# Patient Record
Sex: FEMALE | Race: WHITE
[De-identification: names, ages, dates, MRNs, and addresses within clinical notes are randomized per-mention and may not be internally consistent; named-entity substitution may affect disease eponyms.]

---

## 2018-05-18 ENCOUNTER — HOSPITAL ENCOUNTER (OUTPATIENT)
Dept: HOSPITAL 84 - D.US | Age: 28
Discharge: HOME | End: 2018-05-18
Attending: FAMILY MEDICINE
Payer: COMMERCIAL

## 2018-05-18 DIAGNOSIS — R10.11: Primary | ICD-10-CM

## 2018-12-02 ENCOUNTER — HOSPITAL ENCOUNTER (OUTPATIENT)
Dept: HOSPITAL 84 - D.LDO | Age: 28
Discharge: HOME | End: 2018-12-02
Attending: OBSTETRICS & GYNECOLOGY
Payer: COMMERCIAL

## 2018-12-02 DIAGNOSIS — M54.5: ICD-10-CM

## 2018-12-02 DIAGNOSIS — R10.2: ICD-10-CM

## 2018-12-02 DIAGNOSIS — Z3A.19: ICD-10-CM

## 2018-12-02 DIAGNOSIS — O26.892: Primary | ICD-10-CM

## 2018-12-02 LAB
AMPHETAMINES UR QL SCN: NEGATIVE QUAL
APPEARANCE UR: CLEAR
BARBITURATES UR QL SCN: NEGATIVE QUAL
BENZODIAZ UR QL SCN: NEGATIVE QUAL
BILIRUB SERPL-MCNC: NEGATIVE MG/DL
BZE UR QL SCN: NEGATIVE QUAL
CANNABINOIDS UR QL SCN: NEGATIVE QUAL
COLOR UR: YELLOW
GLUCOSE SERPL-MCNC: 50 MG/DL
KETONES UR STRIP-MCNC: NEGATIVE MG/DL
NITRITE UR-MCNC: NEGATIVE MG/ML
OPIATES UR QL SCN: NEGATIVE QUAL
PCP UR QL SCN: NEGATIVE QUAL
PH UR STRIP: 5 [PH] (ref 5–6)
PROT UR-MCNC: NEGATIVE MG/DL
SP GR UR STRIP: 1.02 (ref 1–1.02)
UROBILINOGEN UR-MCNC: NORMAL MG/DL

## 2019-01-31 ENCOUNTER — HOSPITAL ENCOUNTER (OUTPATIENT)
Dept: HOSPITAL 84 - D.LDO | Age: 29
Discharge: HOME | End: 2019-01-31
Attending: OBSTETRICS & GYNECOLOGY
Payer: COMMERCIAL

## 2019-01-31 DIAGNOSIS — R10.2: ICD-10-CM

## 2019-01-31 DIAGNOSIS — O26.893: Primary | ICD-10-CM

## 2019-01-31 DIAGNOSIS — M54.5: ICD-10-CM

## 2019-01-31 DIAGNOSIS — Z3A.28: ICD-10-CM

## 2019-01-31 LAB
APPEARANCE UR: CLEAR
BILIRUB SERPL-MCNC: NEGATIVE MG/DL
COLOR UR: YELLOW
GLUCOSE SERPL-MCNC: NEGATIVE MG/DL
KETONES UR STRIP-MCNC: NEGATIVE MG/DL
NITRITE UR-MCNC: NEGATIVE MG/ML
PH UR STRIP: 7 [PH] (ref 5–6)
PROT UR-MCNC: NEGATIVE MG/DL
RBC #/AREA URNS HPF: (no result) /HPF (ref 0–5)
SP GR UR STRIP: 1.01 (ref 1–1.02)
SQUAMOUS #/AREA URNS HPF: (no result) /HPF (ref 0–5)
UROBILINOGEN UR-MCNC: NORMAL MG/DL
WBC #/AREA URNS HPF: (no result) /HPF (ref 0–5)

## 2019-02-05 ENCOUNTER — HOSPITAL ENCOUNTER (OUTPATIENT)
Dept: HOSPITAL 84 - D.LDO | Age: 29
Discharge: HOME | End: 2019-02-05
Attending: OBSTETRICS & GYNECOLOGY
Payer: COMMERCIAL

## 2019-02-05 DIAGNOSIS — O26.893: Primary | ICD-10-CM

## 2019-02-05 DIAGNOSIS — Z3A.29: ICD-10-CM

## 2019-02-05 LAB — FIBRONECTIN FETAL VAG QL: NEGATIVE

## 2019-03-05 ENCOUNTER — HOSPITAL ENCOUNTER (OUTPATIENT)
Dept: HOSPITAL 84 - D.LDO | Age: 29
Discharge: HOME | End: 2019-03-05
Attending: OBSTETRICS & GYNECOLOGY
Payer: COMMERCIAL

## 2019-03-05 DIAGNOSIS — O26.893: Primary | ICD-10-CM

## 2019-03-05 DIAGNOSIS — N89.8: ICD-10-CM

## 2019-03-05 LAB
APPEARANCE UR: CLEAR
BILIRUB SERPL-MCNC: NEGATIVE MG/DL
COLOR UR: YELLOW
GLUCOSE SERPL-MCNC: NEGATIVE MG/DL
KETONES UR STRIP-MCNC: NEGATIVE MG/DL
NITRITE UR-MCNC: NEGATIVE MG/ML
PH UR STRIP: 8 [PH] (ref 5–6)
PROT UR-MCNC: NEGATIVE MG/DL
SP GR UR STRIP: 1.01 (ref 1–1.02)
UROBILINOGEN UR-MCNC: NORMAL MG/DL

## 2019-03-06 ENCOUNTER — HOSPITAL ENCOUNTER (OUTPATIENT)
Dept: HOSPITAL 84 - D.LDO | Age: 29
Discharge: HOME | End: 2019-03-06
Attending: OBSTETRICS & GYNECOLOGY
Payer: COMMERCIAL

## 2019-03-06 DIAGNOSIS — Z3A.33: ICD-10-CM

## 2019-03-06 DIAGNOSIS — O60.03: Primary | ICD-10-CM

## 2019-03-14 ENCOUNTER — HOSPITAL ENCOUNTER (OUTPATIENT)
Dept: HOSPITAL 84 - D.LDO | Age: 29
Discharge: HOME | End: 2019-03-14
Attending: OBSTETRICS & GYNECOLOGY
Payer: COMMERCIAL

## 2019-03-14 ENCOUNTER — HOSPITAL ENCOUNTER (OUTPATIENT)
Dept: HOSPITAL 84 - D.LDO | Age: 29
LOS: 1 days | End: 2019-03-15
Attending: OBSTETRICS & GYNECOLOGY
Payer: COMMERCIAL

## 2019-03-14 DIAGNOSIS — O26.893: Primary | ICD-10-CM

## 2019-03-14 DIAGNOSIS — O26.899: Primary | ICD-10-CM

## 2019-03-14 DIAGNOSIS — Z3A.34: ICD-10-CM

## 2019-03-22 ENCOUNTER — HOSPITAL ENCOUNTER (OUTPATIENT)
Dept: HOSPITAL 84 - D.LDO | Age: 29
Discharge: HOME | End: 2019-03-22
Attending: OBSTETRICS & GYNECOLOGY
Payer: COMMERCIAL

## 2019-03-22 DIAGNOSIS — Z3A.35: ICD-10-CM

## 2019-03-22 DIAGNOSIS — O26.893: Primary | ICD-10-CM

## 2019-03-22 LAB
APPEARANCE UR: CLEAR
BACTERIA #/AREA URNS HPF: (no result) /HPF
BILIRUB SERPL-MCNC: NEGATIVE MG/DL
COLOR UR: YELLOW
GLUCOSE SERPL-MCNC: NEGATIVE MG/DL
KETONES UR STRIP-MCNC: NEGATIVE MG/DL
NITRITE UR-MCNC: NEGATIVE MG/ML
PH UR STRIP: 7 [PH] (ref 5–6)
PROT UR-MCNC: (no result) MG/DL
RBC #/AREA URNS HPF: (no result) /HPF (ref 0–5)
SP GR UR STRIP: 1.01 (ref 1–1.02)
UROBILINOGEN UR-MCNC: NORMAL MG/DL
WBC #/AREA URNS HPF: (no result) /HPF (ref 0–5)

## 2019-03-24 ENCOUNTER — HOSPITAL ENCOUNTER (OUTPATIENT)
Dept: HOSPITAL 84 - D.LDO | Age: 29
Discharge: HOME | End: 2019-03-24
Attending: OBSTETRICS & GYNECOLOGY
Payer: COMMERCIAL

## 2019-03-24 DIAGNOSIS — O26.893: Primary | ICD-10-CM

## 2019-03-24 DIAGNOSIS — Z3A.35: ICD-10-CM

## 2019-03-24 LAB
APPEARANCE UR: CLEAR
BILIRUB SERPL-MCNC: NEGATIVE MG/DL
COLOR UR: YELLOW
GLUCOSE SERPL-MCNC: NEGATIVE MG/DL
KETONES UR STRIP-MCNC: NEGATIVE MG/DL
NITRITE UR-MCNC: NEGATIVE MG/ML
PH UR STRIP: 7 [PH] (ref 5–6)
PROT UR-MCNC: NEGATIVE MG/DL
SP GR UR STRIP: 1.01 (ref 1–1.02)
UROBILINOGEN UR-MCNC: NORMAL MG/DL

## 2019-03-26 ENCOUNTER — HOSPITAL ENCOUNTER (OUTPATIENT)
Dept: HOSPITAL 84 - D.LDO | Age: 29
Discharge: HOME | End: 2019-03-26
Attending: OBSTETRICS & GYNECOLOGY
Payer: COMMERCIAL

## 2019-03-26 DIAGNOSIS — O26.853: Primary | ICD-10-CM

## 2019-03-26 DIAGNOSIS — Z3A.36: ICD-10-CM

## 2019-03-26 DIAGNOSIS — R10.9: ICD-10-CM

## 2019-03-26 LAB
APPEARANCE UR: CLEAR
BACTERIA #/AREA URNS HPF: (no result) /HPF
BILIRUB SERPL-MCNC: NEGATIVE MG/DL
COLOR UR: YELLOW
GLUCOSE SERPL-MCNC: NEGATIVE MG/DL
KETONES UR STRIP-MCNC: NEGATIVE MG/DL
NITRITE UR-MCNC: NEGATIVE MG/ML
PH UR STRIP: 6 [PH] (ref 5–6)
PROT UR-MCNC: NEGATIVE MG/DL
RBC #/AREA URNS HPF: (no result) /HPF (ref 0–5)
SP GR UR STRIP: 1.01 (ref 1–1.02)
SQUAMOUS #/AREA URNS HPF: (no result) /HPF (ref 0–5)
UROBILINOGEN UR-MCNC: NORMAL MG/DL
WBC #/AREA URNS HPF: (no result) /HPF (ref 0–5)

## 2019-04-02 ENCOUNTER — HOSPITAL ENCOUNTER (OUTPATIENT)
Dept: HOSPITAL 84 - D.LDO | Age: 29
Discharge: HOME | End: 2019-04-02
Attending: OBSTETRICS & GYNECOLOGY
Payer: COMMERCIAL

## 2019-04-02 DIAGNOSIS — O16.3: Primary | ICD-10-CM

## 2019-04-02 DIAGNOSIS — Z3A.37: ICD-10-CM

## 2019-04-02 DIAGNOSIS — Z3A.36: ICD-10-CM

## 2019-04-02 DIAGNOSIS — O26.893: Primary | ICD-10-CM

## 2019-04-02 LAB
APPEARANCE UR: (no result)
BILIRUB SERPL-MCNC: NEGATIVE MG/DL
COLOR UR: YELLOW
GLUCOSE SERPL-MCNC: NEGATIVE MG/DL
KETONES UR STRIP-MCNC: NEGATIVE MG/DL
NITRITE UR-MCNC: NEGATIVE MG/ML
PH UR STRIP: 6 [PH] (ref 5–6)
PROT UR-MCNC: NEGATIVE MG/DL
SP GR UR STRIP: 1.02 (ref 1–1.02)
UROBILINOGEN UR-MCNC: NORMAL MG/DL

## 2019-04-09 ENCOUNTER — HOSPITAL ENCOUNTER (INPATIENT)
Dept: HOSPITAL 84 - D.LD | Age: 29
LOS: 2 days | Discharge: HOME | End: 2019-04-11
Attending: OBSTETRICS & GYNECOLOGY | Admitting: OBSTETRICS & GYNECOLOGY
Payer: COMMERCIAL

## 2019-04-09 VITALS — WEIGHT: 198 LBS | BODY MASS INDEX: 30.01 KG/M2 | HEIGHT: 68 IN

## 2019-04-09 VITALS — DIASTOLIC BLOOD PRESSURE: 68 MMHG | SYSTOLIC BLOOD PRESSURE: 123 MMHG

## 2019-04-09 DIAGNOSIS — F43.29: ICD-10-CM

## 2019-04-09 DIAGNOSIS — Z3A.38: ICD-10-CM

## 2019-04-09 PROCEDURE — 10907ZC DRAINAGE OF AMNIOTIC FLUID, THERAPEUTIC FROM PRODUCTS OF CONCEPTION, VIA NATURAL OR ARTIFICIAL OPENING: ICD-10-PCS | Performed by: OBSTETRICS & GYNECOLOGY

## 2019-04-10 VITALS — DIASTOLIC BLOOD PRESSURE: 82 MMHG | SYSTOLIC BLOOD PRESSURE: 128 MMHG

## 2019-04-10 LAB
ERYTHROCYTE [DISTWIDTH] IN BLOOD BY AUTOMATED COUNT: 13 % (ref 11.5–14.5)
HCT VFR BLD CALC: 25.7 % (ref 36–48)
HGB BLD-MCNC: 8.6 G/DL (ref 12–16)
MCH RBC QN AUTO: 28.2 PG (ref 26–34)
MCHC RBC AUTO-ENTMCNC: 33.5 G/DL (ref 31–37)
MCV RBC: 84.3 FL (ref 80–100)
PLATELET # BLD: 176 10X3/UL (ref 130–400)
PMV BLD AUTO: 12.2 FL (ref 7.4–10.4)
PROT UR-MCNC: 9.5 MG/DL (ref 0–11.9)
PROTEIN [MASS/TIME] IN 12 HOUR URINE: 128 MG/12HR (ref 0–83)
RBC # BLD AUTO: 3.05 10X6/UL (ref 4–5.4)
WBC # BLD AUTO: 14.1 10X3/UL (ref 4.8–10.8)

## 2019-04-10 NOTE — NUR
RESTING WITH EYES CLOSED, RESPIRATIONS REGULAR AND UNLABORED, NO S/S OF
DISTRESS NOTED. SPOUSE RESTING ON COUCH. BED IN LOW POSITION WITH UPPER SIDE
RAILS RAISED X2. CALL LIGHT AND PHONE WITHIN REACH. WILL CONTINUE TO MONITOR
AND ASSIST PRN.

## 2019-04-10 NOTE — NUR
BREASTFEEDING INFANT AT THIS TIME. DENIES NEEDS. ICE WATER PROVIDED. SPOUSE
REMAINS AT BEDSIDE, SUPPORTIVE AND ATTENTIVE TO PT AND INFANT. BED IN LOW
POSITION WITH UPPER SIDE RAILS RAISED X2. CALL LIGHT AND PHONE WITHIN REACH.
WILL CONTINUE TO MONITOR AND ASSIST PRN.

## 2019-04-10 NOTE — NUR
MULTIPLE VISITOR REMAIN AT BEDSIDE. PT CONVERSING AND LAUGHING WITH VISITORS.
DENIES NEEDS AT THIS TIME. BED IN LOW POSITION WITH UPPER SIDE RAILS RAISED
X2. CALL LIGHT AND PHONE WITHIN REACH. WILL CONTINUE TO MONITOR AND ASSIST
PRN.

## 2019-04-10 NOTE — NUR
DENIES NEEDS AT THIS TIME.  TOWELS PLACED IN BATHROOM AND PT UNDERSTANDS TO
CALL WHEN SHE IS READY TO SHOWER SO THAT IV CAN BE WRAPPED.

## 2019-04-10 NOTE — NUR
BREAST FEEDING INFANT. STATES BACK PAIN IS GONE BUT CRAMPING RETURNED WITH
BREASTFEEDING AT 5/10, DENIES NEED FOR INTERVENTION AT THIS TIME. ICE WATER
PROVIDED. BED IN LOW POSITION WITH UPPER SIDE RAILS RAISED X2. CL AND PHONE
WITHIN REACH. WILL CONTINUE TO MONITOR AND ASSIST PRN.

## 2019-04-10 NOTE — NUR
SHIFT ASSESSMENT COMPLETED PER FLOWSHEET. C/O ABD CRAMPING 4/10, NORCO GIVEN
PER ORDER AND PT REQUEST. VSS. FUNDUS FIRM MIDLINE AND U2, SMALL AMT RUBRA
LOCHIA, NO CLOTS NOTED. REPORTS THAT SHE IS VOIDING WITHOUT DIFFICULTY AND
USING PERIBOTTLE WITH EACH VOID. PLAN OF CARE REVIEWED WITH PT AND SPOUSE,
VERBALIZE UNDERSTANDING AND AGREEMENT, DENY QUESTIONS. BED IN LOW POSITION
WITH UPPER SIDE RAILS RAISED X2. CALL LIGHT AND PHONE WITHIN REACH. WILL
CONTINUE TO MONITOR AND ASSIST PRN.

## 2019-04-10 NOTE — NUR
OUT OF SHOWER. C/O CONSTANT BACK ACHE 7/10 AND ABD CRAMPING 4-5/10. MOTRIN
GIVEN PER ORDER AND REQUEST. REQUEST HEATING PAD. ORDER OBTAINED FOR K-PAD,
K-PAD PROVIDED AND INSTRUCTED ON USE, VERBALIZES UNDERSTANDING.

## 2019-04-10 NOTE — NUR
REQUESTS TO SHOWER, REFUSES LINEN CHANGE. SPOUSE REPORTS THAT HE WILL REMAIN
IN ROOM FOR SHOWER. INSTRUCTED ON CALL LIGHT USE IN BATHROOM, VERBALIZES
UNDERSTANDING. WILL CONTINUE TO MONITOR.

## 2019-04-10 NOTE — NUR
PT CALLS OUT WITH REQUEST FOR PAIN MED,  MEDS GIVEN AS SCANNED TO EMAR.
INFANT TO BREAST AT THIS TIME,  SIG OTHER AT BEDSIDE.  SIDE RAILS UP X 2 WITH
CALL LIGHT IN REACH.

## 2019-04-11 VITALS — SYSTOLIC BLOOD PRESSURE: 124 MMHG | DIASTOLIC BLOOD PRESSURE: 76 MMHG

## 2019-04-11 LAB
BASOPHILS NFR BLD AUTO: 0.1 % (ref 0–2)
BASOPHILS NFR BLD AUTO: 0.2 % (ref 0–2)
EOSINOPHIL NFR BLD: 0.8 % (ref 0–7)
EOSINOPHIL NFR BLD: 0.9 % (ref 0–7)
ERYTHROCYTE [DISTWIDTH] IN BLOOD BY AUTOMATED COUNT: 12.8 % (ref 11.5–14.5)
ERYTHROCYTE [DISTWIDTH] IN BLOOD BY AUTOMATED COUNT: 13.1 % (ref 11.5–14.5)
HCT VFR BLD CALC: 29.7 % (ref 36–48)
HCT VFR BLD CALC: 30.5 % (ref 36–48)
HGB BLD-MCNC: 10.1 G/DL (ref 12–16)
HGB BLD-MCNC: 10.4 G/DL (ref 12–16)
IMM GRANULOCYTES NFR BLD: 0.7 % (ref 0–5)
IMM GRANULOCYTES NFR BLD: 1 % (ref 0–5)
LYMPHOCYTES NFR BLD AUTO: 21.7 % (ref 15–50)
LYMPHOCYTES NFR BLD AUTO: 23.3 % (ref 15–50)
MCH RBC QN AUTO: 28.5 PG (ref 26–34)
MCH RBC QN AUTO: 28.5 PG (ref 26–34)
MCHC RBC AUTO-ENTMCNC: 34 G/DL (ref 31–37)
MCHC RBC AUTO-ENTMCNC: 34.1 G/DL (ref 31–37)
MCV RBC: 83.6 FL (ref 80–100)
MCV RBC: 83.7 FL (ref 80–100)
MONOCYTES NFR BLD: 6.6 % (ref 2–11)
MONOCYTES NFR BLD: 7.4 % (ref 2–11)
NEUTROPHILS NFR BLD AUTO: 68.1 % (ref 40–80)
NEUTROPHILS NFR BLD AUTO: 69.2 % (ref 40–80)
PLATELET # BLD: 132 10X3/UL (ref 130–400)
PLATELET # BLD: 134 10X3/UL (ref 130–400)
PMV BLD AUTO: 11.8 FL (ref 7.4–10.4)
PMV BLD AUTO: 11.9 FL (ref 7.4–10.4)
RBC # BLD AUTO: 3.55 10X6/UL (ref 4–5.4)
RBC # BLD AUTO: 3.65 10X6/UL (ref 4–5.4)
WBC # BLD AUTO: 10.2 10X3/UL (ref 4.8–10.8)
WBC # BLD AUTO: 11.4 10X3/UL (ref 4.8–10.8)

## 2019-04-11 NOTE — NUR
Carola Oh
4/11/[birth year]
 
O: CLC enters room for breastfeeding consultation. Room is dark with dim
lights, patient is sleeping in bed, did not wake when entered room. Infant is
sleeping in crib at bedside, FOB lying on sofa awake and states patient is
sleeping, she is tired. CLC left patient undisturbed.
 
Molly Grover, CLC

## 2019-04-11 NOTE — NUR
ATTEMPT TO ROUND ON PT FOR AM ASSESSMENT, PRIOR TO KNOCKING ON DOOR LOUD
SHOUTING COULD BE HEARD, WHEN NURSE KNOCKS PT SIG OTHER ASK FOR A MINUTE
BEFORE ENTERING, THIS RN ANNOUNCES WHO SHE IS AND AGAIN SIG OTHER REQUEST A
MIN, THAN COMES TO DOOR AND ASK IF NURSE COULD COME BACK IN 20-30 MINUTES.
VERIFIED WITH PT THAT SHE WAS OK AND VERBAL CONSENT THAT SHE WAS IS RECIEVED.

## 2019-04-11 NOTE — NUR
C/O PAIN 6/10 CONSTANT BACK ACHE, WITH INTERMITTENT ABD CRAMPING. REPORTS THAT
MOTRIN HELPED BUT CRAMPING INCREASED WITH BREAST FEEDING. NORCO GIVEN PER
ORDER AND PT REQUEST. INFANT RESTING IN OPEN CRIB AT BEDSIDE. SPOUSE REMAINS
AT BEDSIDE, SUPPORTIVE AND ATTENTIVE TO PT AND INFANT NEEDS. BED IN LOW
POSITION WITH UPPER SIDE RAILS RAISED X2. CALL LIGHT AND PHONE WITHIN REACH.
WILL CONTINUE TO MONITOR AND ASSIST PRN.

## 2019-04-11 NOTE — NUR
VERBAL AND WRITTEN DISCHARGE INSTRUCTIONS GONE OVER WITH PT, SHE VERBALLY
STATES HER UNDERSTANDING TO INFO/MEDS/S&S OF INFECTION AND WHAT TO DO IF SHE
HAS ANY QUESTIONS OR CONCERNS AFTER SHE IS HOME.  WRITTEN SCRIPT PROVIDED FOR
MOTRIN 600MG. DRESSING INFANT AT THIS TIME AND THAN WILL SECURE IN CARRIER AND
CALL NURSERY TO VERIFY. WILL CALL WHEN READY TO LEAVE.

## 2019-04-11 NOTE — NUR
C/O PAIN 7/10 CONSTANT BACK ACHE AND INTERMITTENT ABD CRAMPING. NORCO AND
MOTRIN GIVEN PER ORDER AND PT REQUEST. ICE WATER PROVIDED PER REQUEST. SITTING
IN HIGH FOWLERS POSITION PLAYING ON CELL PHONE. DENIES ADDITIONAL NEEDS.
INFANT IN OPEN CRIB RESTING QUIETLY AT BEDSIDE. SPOUSE RESTING ON COUCH. BED
IN LOW POSITION WITH UPPER SIDE RAILS RAISED X2. CALL LIGHT AND PHONE WITHIN
REACH. WILL CONTINUE TO MONITOR AND ASSIST PRN.

## 2019-04-11 NOTE — NUR
PT FAMILY MEMBER TO NURSE MANAGER'S OFFICE TO REPORT CONCERNS OF PT'S MENTAL
STATUS. FAMILY MEMBER REPORTS PT HAS VERBALIZED A THREAT TO KILL HERSELF AND
HER  INFANT RECENTLY AND HAS SENT TEXT MESSAGES AS TO SUCH TO FOB. THIS
RN ENSURE THAT DR BROWN WILL BE NOTIFIED AND THE REPORT WILL BE ESCALATED
TO ALL THAT NEED TO BE AWARE.

## 2019-04-11 NOTE — NUR
Dr Valle notified with report given of recent info about pt possible threat
to harm herself &/or baby.  Orders received to contact/consult with case
management and/or  psych consult. Spoke with Shantelle in case management, order
than place for phys consult attn psych.  She (Shantelle) is assisting by taking
order to deepa malcolm and speaking to MD in that area.

## 2019-04-11 NOTE — NUR
PAIN MEDS GIVEN PER PT REQUEST.  SHE IS BREASTFEEDING AT THIS TIME, SIDE RAILS
UP X 2 WITH PHONE AND CALL LIGHT IN REACH.

## 2019-04-11 NOTE — NUR
DR SMITH ON UNIT WITH CASE MANAGEMENT, THIS RN TO ROOM WITH HIM FOR HIS
ASSESSMENT PER MD REQUEST.  UPON ENTERING ROOM, PT IS ALONE WITH INFANT TO
BREAST AND WATCHING TV.  SHE CALMLY LISTENS AS DR SMITH EXPLAINS WHY HE HAS
BEEN CONSULTED, PLEASE SEE MD NOTE ABOUT HIS ASSESSMENT.

## 2019-04-11 NOTE — NUR
DR. BROWN PAGED WITH IMMEDIATE CALL BACK. CBC RESULTS AND PT MOST RECENT
INTERACTION WITH STAFF REPORTED. PER DR. BROWN CONTINUE WITH CURRENT
INTERVENTIONS FOR PAIN.

## 2019-04-11 NOTE — NUR
PAIN REASSESSMENT COMPLETED. PT TEARFUL, VERBALIZES NO CHANGE IN PAIN.
REPORTED TO DR. BROWN. ORDERS REC'D FOR CBC TO BE DRAWN AND CALL RESULTS TO
HIM. NOTIFIED MOHSEN IN LAB, STATES THAT LAB WILL COME TO UNIT AND DRAW.

## 2019-04-11 NOTE — NUR
PT TAKEN OUT WITH INFANT IN CARRIER, HOME BY PRIVATE CAR WITH FATHER OF BABY.
=============================================================================

## 2019-04-11 NOTE — NUR
PT RESTING ON RIGHT SIDE WITH EYES CLOSED, RESPIRATION REGULAR AND UNLABORED,
NO S/S OF DISTRESS NOTED. REPORT GIVEN TO PAMELA ALVAREZ RN.

## 2019-04-11 NOTE — NUR
AM ASSESSMENT COMPLETED,  PT IS ALONE AT THIS TIME AND EXPRESSES THAT SHE IS
SORRY FOR EARLIER AND SHE IS JUST REALLY TIRED AND NEEDS SOME SLEEP. WILL CALL
FOR NURSE IF NEEDED.  SIDE RAILS UP X 2 CALL LIGHT IN REACH.

## 2019-04-11 NOTE — NUR
PT RETURNS TO UNIT FOR RHOGHAM INJECTION PER MD ORDERS.  PT ADMINISTERED
RHOGHAM 300MG IM TO LEFT DELTOID, BANDAID APPLIED, TOLERATES PROCEDURE WELL.

## 2019-04-13 NOTE — CN
PATIENT NAME:ODALIS FRY                        MEDICAL RECORD: [id number]
: 89                                              LOCATION:[id number]
ADMIT DATE: 19                                       ACCOUNT: [id number]
CONSULTING PHYSICIAN:    AMIE SMITH MD             
                                               
REFERRING PHYSICIAN:     LESLIE BROWN MD        
 
 
DATE OF CONSULTATION:  2019
 
IDENTIFYING DATA:  The patient is 28 years old and admitted to the hospital
secondary to needing to deliver a baby.
 
CHIEF COMPLAINT:  None.
 
HISTORY OF PRESENT ILLNESS:  I have gotten information almost immediately after
hanging up the phone and have evaluated the patient.  It seems that the father
of this child and the patient, Odalis have argued and have broken up
unfortunately.  The paternal grandmother reported to our nursing staff that she
was concerned that Odalis was going to go home and kill herself and the baby.  I
have interviewed Odalis and disagree.  Odalis has an euthymic mood and appropriate
affect, is fully oriented and passes all questions on her mental status exam
fine.  She has never made any statement like this.  Her former boyfriend who is
the father of this child comes in and says that they have argued, that she has
lost control of herself, but when questioned about this, it seems that Odalis has
told him to leave and cursed him.  She has never made any statements about
wanting to hurt herself or the baby.  He was asked directly is he concerned
about the safety of the baby or Odalis and he says no.  He has no concerns about
their safety.
 
ASSESSMENT:  Adjustment disorder with mixed emotional features.
 
PLAN:  Odalis is not acutely dangerous to herself or the baby.  She did score an
8 on her depression screening, but I still do not think she is clinically
depressed.  I am not sure if she answered all the questions correctly.  She has
ADHD and is treated for that and has not had her psychostimulant medication and
that is subtly obvious when I talked to her.  I see no evidence of an acute mood
disorder.  No evidence of acute or direct dangerousness.  The patient has her
baby at her breast while I am in the room speaking with her and it is clear that
she loves the child, has bonded with the child and is showing the child
affection.  I see no impediment to releasing her and the child and see no reason
or purpose for an outpatient mental health followup.
 
TRANSINT:[id number] Voice Confirmation ID: [id number] DOCUMENT ID: [id number]
                                           
                                           AMIE SMITH MD             
 
 
 
Electronically Signed by AMIE SMITH on 19 at 1233
CC:                                                             [id number]
DICTATION DATE: 19 1548     :     19 1604      DIS IN  
                                                                      19
[place] [id number] [place] [id number]

## 2019-09-03 NOTE — NUR
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 Pt sent for orthopedics to cast it VERBAL AND WRITTEN DISCHARGE INSTRUCTIONS GONE OVER WITH PT, SHE VERBALLY
    STATES HER UNDERSTANDING TO INFO/MEDS/S&S OF INFECTION AND WHAT TO DO IF
SHE
    HAS ANY QUESTIONS OR CONCERNS AFTER SHE IS HOME.  WRITTEN SCRIPT PROVIDED
FOR
    MOTRIN 600MG. DRESSING INFANT AT THIS TIME AND THAN WILL SECURE IN CARRIER
AND
    CALL NURSERY TO VERIFY. WILL CALL WHEN READY TO LEAVE.
 
==============================================================================

## 2020-08-02 ENCOUNTER — HOSPITAL ENCOUNTER (EMERGENCY)
Dept: HOSPITAL 84 - D.ER | Age: 30
Discharge: HOME | End: 2020-08-02
Payer: COMMERCIAL

## 2020-08-02 VITALS
BODY MASS INDEX: 24.3 KG/M2 | WEIGHT: 160.34 LBS | HEIGHT: 68 IN | WEIGHT: 160.34 LBS | BODY MASS INDEX: 24.3 KG/M2 | HEIGHT: 68 IN

## 2020-08-02 VITALS — SYSTOLIC BLOOD PRESSURE: 112 MMHG | DIASTOLIC BLOOD PRESSURE: 71 MMHG

## 2020-08-02 DIAGNOSIS — R11.0: ICD-10-CM

## 2020-08-02 DIAGNOSIS — U07.1: Primary | ICD-10-CM

## 2020-08-02 DIAGNOSIS — R19.7: ICD-10-CM

## 2020-08-02 DIAGNOSIS — R50.9: ICD-10-CM

## 2020-08-02 DIAGNOSIS — R53.81: ICD-10-CM

## 2020-08-02 LAB
ALBUMIN SERPL-MCNC: 3.9 G/DL (ref 3.4–5)
ALP SERPL-CCNC: 68 U/L (ref 30–120)
ALT SERPL-CCNC: 16 U/L (ref 10–68)
ANION GAP SERPL CALC-SCNC: 11.9 MMOL/L (ref 8–16)
BASOPHILS NFR BLD AUTO: 0.2 % (ref 0–2)
BILIRUB SERPL-MCNC: 0.89 MG/DL (ref 0.2–1.3)
BILIRUB SERPL-MCNC: NEGATIVE MG/DL
BUN SERPL-MCNC: 14 MG/DL (ref 7–18)
CALCIUM SERPL-MCNC: 8.8 MG/DL (ref 8.5–10.1)
CHLORIDE SERPL-SCNC: 104 MMOL/L (ref 98–107)
CO2 SERPL-SCNC: 27.8 MMOL/L (ref 21–32)
CREAT SERPL-MCNC: 0.9 MG/DL (ref 0.6–1.3)
EOSINOPHIL NFR BLD: 1.2 % (ref 0–7)
ERYTHROCYTE [DISTWIDTH] IN BLOOD BY AUTOMATED COUNT: 11.9 % (ref 11.5–14.5)
GLOBULIN SER-MCNC: 3.6 G/L
GLUCOSE SERPL-MCNC: NEGATIVE MG/DL
HCG UR QL: NEGATIVE
HCT VFR BLD CALC: 42.4 % (ref 36–48)
HGB BLD-MCNC: 14.5 G/DL (ref 12–16)
IMM GRANULOCYTES NFR BLD: 0.2 % (ref 0–5)
KETONES UR STRIP-MCNC: NEGATIVE MG/DL
LYMPHOCYTES NFR BLD AUTO: 32.2 % (ref 15–50)
MCH RBC QN AUTO: 30.2 PG (ref 26–34)
MCHC RBC AUTO-ENTMCNC: 34.2 G/DL (ref 31–37)
MCV RBC: 88.3 FL (ref 80–100)
MONOCYTES NFR BLD: 14.3 % (ref 2–11)
NEUTROPHILS NFR BLD AUTO: 51.9 % (ref 40–80)
NITRITE UR-MCNC: NEGATIVE MG/ML
OSMOLALITY SERPL CALC.SUM OF ELEC: 279 MOSM/KG (ref 275–300)
PH UR STRIP: 5 [PH] (ref 5–6)
PLATELET # BLD: 164 10X3/UL (ref 130–400)
PMV BLD AUTO: 10.8 FL (ref 7.4–10.4)
POTASSIUM SERPL-SCNC: 3.7 MMOL/L (ref 3.5–5.1)
PROT SERPL-MCNC: 7.5 G/DL (ref 6.4–8.2)
RBC # BLD AUTO: 4.8 10X6/UL (ref 4–5.4)
SODIUM SERPL-SCNC: 140 MMOL/L (ref 136–145)
UROBILINOGEN UR-MCNC: NORMAL MG/DL
WBC # BLD AUTO: 4.9 10X3/UL (ref 4.8–10.8)

## 2021-03-16 LAB
BASOPHILS NFR BLD AUTO: 0.1 % (ref 0–2)
EOSINOPHIL NFR BLD: 1.1 % (ref 0–7)
ERYTHROCYTE [DISTWIDTH] IN BLOOD BY AUTOMATED COUNT: 12.1 % (ref 11.5–14.5)
HCT VFR BLD CALC: 38 % (ref 36–48)
HGB BLD-MCNC: 12.9 G/DL (ref 12–16)
IMM GRANULOCYTES NFR BLD: 0.1 % (ref 0–5)
LYMPHOCYTES # BLD: 1.75 10X3/UL (ref 1.18–3.74)
LYMPHOCYTES NFR BLD AUTO: 23.1 % (ref 15–50)
MCH RBC QN AUTO: 30 PG (ref 26–34)
MCHC RBC AUTO-ENTMCNC: 33.9 G/DL (ref 31–37)
MCV RBC: 88.4 FL (ref 80–100)
MONOCYTES NFR BLD: 6.2 % (ref 2–11)
NEUTROPHILS # BLD: 5.24 10X3/UL (ref 1.56–6.13)
NEUTROPHILS NFR BLD AUTO: 69.4 % (ref 40–80)
PLATELET # BLD: 210 10X3/UL (ref 130–400)
PMV BLD AUTO: 10.3 FL (ref 7.4–10.4)
RBC # BLD AUTO: 4.3 10X6/UL (ref 4–5.4)
WBC # BLD AUTO: 7.6 10X3/UL (ref 4.8–10.8)

## 2021-03-19 ENCOUNTER — HOSPITAL ENCOUNTER (OUTPATIENT)
Dept: HOSPITAL 84 - D.OPS | Age: 31
Discharge: HOME | End: 2021-03-19
Attending: OBSTETRICS & GYNECOLOGY
Payer: COMMERCIAL

## 2021-03-19 VITALS — WEIGHT: 154 LBS | BODY MASS INDEX: 23.34 KG/M2 | BODY MASS INDEX: 23.34 KG/M2 | HEIGHT: 68 IN

## 2021-03-19 VITALS — DIASTOLIC BLOOD PRESSURE: 68 MMHG | SYSTOLIC BLOOD PRESSURE: 121 MMHG

## 2021-03-19 DIAGNOSIS — Z30.2: Primary | ICD-10-CM

## 2021-03-19 DIAGNOSIS — N92.0: ICD-10-CM

## 2021-03-19 DIAGNOSIS — Z64.1: ICD-10-CM

## 2021-03-19 LAB — HCG UR QL: NEGATIVE

## 2021-03-19 NOTE — NUR
1230 MEDICATED FOR PAIN. 1330 PT STATED SHE IS STILL IN PAIN AND HER
PAIN LEVEL IS A 7/10. ANESTHESIA CALLED FOR REPEAT OF NORCO 5MG. 1420
IV REMOVED PAIN A 2/10 1425 PT DISCHARGED DRESSING TO UPPER ABDOMEN
REINFORCED. SMAL AMT OF BLOOD NOTED.

## 2021-03-27 NOTE — OP
PATIENT NAME:  ODALIS FRY                    MEDICAL RECORD: [id number]
:89                                             LOCATION:[facility]          
                                                         ADMISSION DATE:        
SURGEON:  LESLIE VALLE MD        
 
 
DATE OF OPERATION:  2021
 
PREOPERATIVE DIAGNOSIS:  Multiparity, the patient desires permanent sterility.
 
POSTOPERATIVE DIAGNOSIS:  Multiparity, the patient desires permanent sterility.
 
PROCEDURE:  Laparoscopic tubal ligation via bipolar cautery, hysteroscopy and
NovaSure endometrial ablation.
 
SURGEON:  Leslie Valle
 
ANESTHESIA:  General endotracheal.
 
INTRAVENOUS FLUIDS:  Per anesthesia record.
 
ESTIMATED BLOOD LOSS:  Minimal.
 
COMPLICATIONS:  None apparent.
 
FINDINGS:
1.  Grossly normal-appearing fallopian tubes, ovaries and uterus.
2.  Grossly normal external genitalia.
3.  Grossly normal-appearing cervix and proliferative endometrium.
 
PROCEDURE IN DETAIL:  The patient was taken to the operating room where general
anesthesia was achieved without any difficulty.  The patient was then prepped
and draped in normal sterile fashion in the dorsal lithotomy position in the
[facility].  At this point, the vagina was prepped with Betadine and the
belly was prepped with chlorhexidine.  Following draping, a 5-mm incision was
made infraumbilically and the intraperitoneal space was entered using the 5-mm
bladeless trocar under direct visualization of the laparoscope.  Following
removal of the introducer and placement of the camera, opening pressure was
found to be less than 8 mmHg.  The patient was insufflated.  Intraperitoneal
placement was confirmed by visualization with the scope.  At this point, a
second 5-mm port was placed approximately 5 cm superior to the pubic symphysis
in the midline under direct visualization of the laparoscope.  Survey of the
abdomen and pelvis was performed laparoscopically.  Attention was then turned to
the fallopian tubes, where an approximate 5-6 cm portion of the mid fallopian
tube was grasped with the Kleppinger bipolar cautery and completely desiccated. 
Good hemostasis was noted from both surgical sites.  The patient was partially
desufflated and again no bleeding was noted from either tubal sterilization
area.  The patient was fully desufflated and the 2 trocars were removed.  The
skin was repaired with 3-0 Monocryl in interrupted fashion times 2.  Attention
was then turned to the vagina.  The patient initially had a straight
catheterization and approximately 100 mL of straw-colored urine.  A Graves
speculum was placed into the vagina and the cervix was identified, grasped on
its anterior lip with a single-tooth tenaculum.  The patient was then sounded to
approximately 9 cm, dilation to approximately 6-mm was performed and
hysteroscopy was performed of the grossly normal-appearing proliferative
endometrium.  The calculations of the cervical and uterine length were performed
and entered into the NovaSure machine.  The patient was dilated to approximately
9-mm and then the NovaSure fan was inserted into the uterus and deployed per
 
 
 
OPERATIVE REPORT                               [id number]    ODALIS FRY  
 
 
protocol.  The collar was advanced towards the cervical opening and initial
pressure check and vacuum check were found to be within normal limits.  Full
burn cycle was performed and then the NovaSure device was removed using the bow
and arrow method.  The tenaculum was removed.  A small area of bleeding from one
of the tenaculum site was repaired with a 2-0 Vicryl in interrupted
figure-of-eight fashion with good hemostasis noted afterwards.  The patient
tolerated the procedure well and was transferred to postanesthesia recovery
stable without incident.
 
TRANSINT:[id number] Voice Confirmation ID: [id number] DOCUMENT ID: [id number]
                                           
                                           LESLIE VALLE MD        
 
 
 
Electronically Signed by LESLIE VALLE on 21 at 1956
 
 
 
 
 
 
 
 
 
 
 
 
 
 
 
 
 
 
 
 
 
 
 
 
 
 
 
 
 
 
 
CC:                                                             [id number]
DICTATION DATE: 21 1328     :     21 1508      [facility] 
                                                                      21
[facility]                                          
[address] [place] [address]
